# Patient Record
Sex: FEMALE | Race: OTHER
[De-identification: names, ages, dates, MRNs, and addresses within clinical notes are randomized per-mention and may not be internally consistent; named-entity substitution may affect disease eponyms.]

---

## 2018-12-13 NOTE — NUR
PT WAS BIB FAMILY C/O CHEST PAIN SINCE YDAY, PT AMBULATORY, AAOX4, RESPIRATIONS 
EVEN AND UNLABORED, NAD NOTED, PT ON MONITOR, VSS, AWAITING MD VICENTE

## 2018-12-13 NOTE — NUR
PT RESTING IN BED DENYING ANY CHEST PAIN OR DISTRESS.FAMILY MEMBERS AT BEDSIDE.CALL LIGHT 
PLACED WITHIN REACH.

## 2018-12-13 NOTE — NUR
RN NOTES



RECEIVED PATIENT AWAKE, HOB ELEVATED, ON ROOM AIR AND TOLERATED WELL. TELE MONITOR READS 
SINUS RHYTHM WITH HEART RATE AT 81. PATIENT IS ALERT AND ORIENTED X3, DENIES PAIN, NAUSEA 
AND VOMITING AT THIS TIME. IV ACCESS ON RIGHT HAND INTACT. PLAN OF CARE DISCUSSED WITH THE 
PATIENT AND FAMILY AT BEDSIDE AND VERBALIZED UNDERSTANDING. FALL PRECAUTION OBSERVED. KEPT 
COMFORTABLE AND ATTENDED. WILL CONTINUE TO MONITOR PATIENT.

## 2018-12-13 NOTE — NUR
TELE RN ADMITTING NOTES

ADMITTED PT FROM ER VIA [place].PT ALERT AND ORIENTED X4.VERBALLY RESPONSIVE.SKIN INTACT.PT 
AMBULATES AD JULI.ON TELE WITH SR HR 82.DENIES CHEST PAIN OR ANY DISTRESS AT THIS TIME.O2 SAT 
96%RA .Japanese SPEAKING ONLY.DAUGHTER AT BEDSIDE.WITH ONGOING ZITHROMAX IV TO 
COMPLETE-INFUSING WELL.ORIENTED TO HER ROOM.CALL LIGHT PLACED WITHIN REACH.

## 2018-12-14 NOTE — NUR
blood sugar 57mg/dl. hbg 7.8/hct 23. Dr Fried group paged to inform about the patient,s 
blood sugar llevel and to clarify if lovenox can be given with the hbg level from 8.1. 
awaiting reponse from the physician on call, orange juice with 4 packets of recular sugar 
given as patient is alert, will bne given diet.

## 2018-12-14 NOTE — NUR
MS/RN   NOTE



RECEIVED CARDIAC DIET ORDER STARTING 12/14/18 BREAKFAST FROM DR MARI. THE ORDER IS READ 
BACK, VERIFIED. NOTED AND CARRIED OUT.

## 2018-12-14 NOTE — NUR
RN NOTES



PATIENT STABLE OVERNIGHT, VITAL SIGNS STABLE, AFEBRILE. NO SIGNS OF DISTRESS AND DISCOMFORT 
NOTED. DENIES PAIN, NAUSEA AND VOMITING. ON ROOM AIR AND TOLERATED WELL, DENIES SOB. 
TELEMONITOR READS SINUS RHYTHM WITH HEART RATE AT 81. KEPT PATIENT ON NPO PRIOR TO BE SEEN 
BY CARDIOLOGIST. FALL PRECAUTION OBSERVED. ALL NEEDS ATTENDED. WILL CONTINUE TO MONITOR PT.

## 2018-12-14 NOTE — NUR
MS/RN   NOTE



BLOOD SUGAR RECHECKED AND IT IS 78. THE PATIENT IN NO APPARENT DISTRESS. ALERT AND ORIENTED 
X3. IN NO DISTRESS.

## 2018-12-14 NOTE — NUR
with orders to hold the lovenox and to recheck the blood sugar after the orange juice with 4 
packets of regular sugar given. patient is alert and responsive

## 2018-12-14 NOTE — NUR
with orders to hold the lovenox tonight and will needs to reclarify the order for loveox due 
tro low hbg

## 2018-12-14 NOTE — NUR
MS/RN   NOTE





BLOOD SUGAR 157 AND 2 UNITS REGULAR INSULIN GIVEN PER ORDER. NO ADVERSE REACTIONS NOTED.

## 2018-12-14 NOTE — NUR
MS/RN   NOTE



THE PATIENT VERBALIZED RELIEF FROM SOB DUE TO HAVING OXYGEN (2L/MIN VIA NASAL CANNULA). WILL 
CONTINUE TO MONITOR THE PATIENT.

## 2018-12-14 NOTE — NUR
MS/RN   OPENING NOTE



THE PATIENT IS RECEIVED IN BED. ALERT AND ORIENTED X3 AND SPEAKS Danish. ABLE TO MAKE NEEDS 
KNOWN. DENIES PAIN. RESPIRATION REGULAR AND UNLABORED BUT WITH EPISODES OF SOB AND SHE HAS 
DIFFICULTY TAKING DEEP BREATH. OXYGEN SATURATION IN ROOM AIR AT 91%.  PATIENT IS PLACED ON 
2L/MIN VIA NASAL CANNULA AND SATURATION LEVEL INCREASED TO 95%. WILL CONTINUE TO MONITOR. 
RIGHT HAND G 20 PATENT AND SALINE LOCKED. BED LOW AND LOCKED. SIDE RAILS UP X3. CALL LIGHT 
WITHIN REACH. WILL CONTINUE TO MONITOR.

## 2018-12-14 NOTE — NUR
Met with patient and spouse, both speaks Turkmen only. Patient lives on the 3rd floor 
apartment with elevator access. She is ambulatory and independent with adl's. Her pcp is 
from the [facility] in [facility]. Family will ride when discharge. 


-------------------------------------------------------------------------------

Addendum: 12/14/18 at 1844 by JULI JOY RN

-------------------------------------------------------------------------------

Amended: Links added.

## 2018-12-14 NOTE — NUR
MS/RN   CLOSING NOTE



THE PATIENT ALERT AND ORIENTED X3. RECEIVING OXYGEN AT 2L/MIN VIA NASAL CANNULA AND 
SATURATION IS AT 95%. DENIES SOB AT THIS TIME. DENIES PAIN. RIGHT HAND G 20 PATENT AND 
SALINE LOCKED. BED LOW AND LOCKED. SIDE RAILS UP X3. CALL LIGHT WITHIN REACH. WILL ENDORSE 
TO NIGHT SHIFT.

## 2018-12-15 NOTE — NUR
DISCHARGE NOTE



PT DISCHARGED IN MEDICALLY STABLE CONDITION AT THIS TIME HOME WITH DAUGHTER ABDIEL IN THEIR 
PRIVATE CAR, ALL DISCHARGE PAPERWORK WAS DISCUSSED WITH THE PATIENT AND DAUGHTER, DISCHARGE 
PAPERWORK AND BELONGING LIST WERE SIGNED, IV AND ID BAND WERE REMOVED, PT SKIN WAS DRY AND 
INTACT WITH NO WOUNDS NOTED OR DOCUMENTED IN THE CHART, PT WAS WALKED DOWN BY ME TO THE 
ENTRANCE WHERE SHE LEFT WITH HER DAUGHTER, SHE LEFT A/O X3, NO PAIN OR S/S OF DISTRESS 
NOTED, BREATHING EVEN AND UNLABORED, SKIN DRY AND INTACT WITH NO WOUNDS NOTED, AMBULATORY. 
ALL NEEDS WERE ATTENDED TO DURING HER STAY.

## 2018-12-15 NOTE — NUR
RN OPENING NOTES



PT RECIEVED IN BED AT LOWEST AND LOCKED POSITION WITH SIDE RAILS UP X2, A/O X3 Bengali 
SPEAKING, BREATHING EVEN AND UNLABORED ON RA, NO S/S OF PAIN OR DISTRESS NOTED AT THIS TIME, 
NOTED TO BE NPO BUT ACCORDING TO NIGHT SHIFT RN SHE IS ON A CARDIAC DIET, IV IS PATENT AND 
INTACT, SAFETY PRECAUTIONS IN PLACE, CALL LIGHT WITHIN REACH, WILL MONITOR ACCORDINGLY

## 2022-07-10 ENCOUNTER — HOSPITAL ENCOUNTER (EMERGENCY)
Dept: HOSPITAL 54 - ER | Age: 72
Discharge: HOME | End: 2022-07-10
Payer: COMMERCIAL

## 2022-07-10 VITALS — HEIGHT: 64 IN | BODY MASS INDEX: 25.61 KG/M2 | WEIGHT: 150 LBS

## 2022-07-10 VITALS — DIASTOLIC BLOOD PRESSURE: 74 MMHG | SYSTOLIC BLOOD PRESSURE: 131 MMHG

## 2022-07-10 DIAGNOSIS — Z79.899: ICD-10-CM

## 2022-07-10 DIAGNOSIS — N18.9: ICD-10-CM

## 2022-07-10 DIAGNOSIS — E87.5: ICD-10-CM

## 2022-07-10 DIAGNOSIS — I10: ICD-10-CM

## 2022-07-10 DIAGNOSIS — R42: Primary | ICD-10-CM

## 2022-07-10 DIAGNOSIS — E11.65: ICD-10-CM

## 2022-07-10 DIAGNOSIS — E11.22: ICD-10-CM

## 2022-07-10 LAB
ALBUMIN SERPL BCP-MCNC: 3.5 G/DL (ref 3.4–5)
ALP SERPL-CCNC: 109 U/L (ref 46–116)
ALT SERPL W P-5'-P-CCNC: 14 U/L (ref 12–78)
AST SERPL W P-5'-P-CCNC: 16 U/L (ref 15–37)
BASOPHILS # BLD AUTO: 0 K/UL (ref 0–0.2)
BASOPHILS NFR BLD AUTO: 0.6 % (ref 0–2)
BILIRUB DIRECT SERPL-MCNC: 0.1 MG/DL (ref 0–0.2)
BILIRUB SERPL-MCNC: 0.2 MG/DL (ref 0.2–1)
BUN SERPL-MCNC: 41 MG/DL (ref 7–18)
CALCIUM SERPL-MCNC: 8.7 MG/DL (ref 8.5–10.1)
CHLORIDE SERPL-SCNC: 100 MMOL/L (ref 98–107)
CO2 SERPL-SCNC: 25 MMOL/L (ref 21–32)
CREAT SERPL-MCNC: 2.8 MG/DL (ref 0.6–1.3)
EOSINOPHIL NFR BLD AUTO: 2.2 % (ref 0–6)
EOSINOPHIL NFR BLD MANUAL: 2 % (ref 0–4)
GLUCOSE SERPL-MCNC: 280 MG/DL (ref 74–106)
HCT VFR BLD AUTO: 30 % (ref 33–45)
HGB BLD-MCNC: 10.1 G/DL (ref 11.5–14.8)
LYMPHOCYTES NFR BLD AUTO: 2 K/UL (ref 0.8–4.8)
LYMPHOCYTES NFR BLD AUTO: 25.1 % (ref 20–44)
LYMPHOCYTES NFR BLD MANUAL: 20 % (ref 16–48)
MCHC RBC AUTO-ENTMCNC: 33 G/DL (ref 31–36)
MCV RBC AUTO: 80 FL (ref 82–100)
MONOCYTES NFR BLD AUTO: 0.5 K/UL (ref 0.1–1.3)
MONOCYTES NFR BLD AUTO: 6.4 % (ref 2–12)
MONOCYTES NFR BLD MANUAL: 6 % (ref 0–11)
NEUTROPHILS # BLD AUTO: 5.3 K/UL (ref 1.8–8.9)
NEUTROPHILS NFR BLD AUTO: 65.7 % (ref 43–81)
NEUTS BAND NFR BLD MANUAL: 1 % (ref 0–5)
NEUTS SEG NFR BLD MANUAL: 71 % (ref 42–76)
PLATELET # BLD AUTO: 87 K/UL (ref 150–450)
POTASSIUM SERPL-SCNC: 5.6 MMOL/L (ref 3.5–5.1)
PROT SERPL-MCNC: 8.8 G/DL (ref 6.4–8.2)
RBC # BLD AUTO: 3.79 MIL/UL (ref 4–5.2)
SODIUM SERPL-SCNC: 132 MMOL/L (ref 136–145)
WBC NRBC COR # BLD AUTO: 8 K/UL (ref 4.3–11)

## 2022-07-10 PROCEDURE — 80076 HEPATIC FUNCTION PANEL: CPT

## 2022-07-10 PROCEDURE — 99285 EMERGENCY DEPT VISIT HI MDM: CPT

## 2022-07-10 PROCEDURE — 96361 HYDRATE IV INFUSION ADD-ON: CPT

## 2022-07-10 PROCEDURE — 85007 BL SMEAR W/DIFF WBC COUNT: CPT

## 2022-07-10 PROCEDURE — 36415 COLL VENOUS BLD VENIPUNCTURE: CPT

## 2022-07-10 PROCEDURE — 80048 BASIC METABOLIC PNL TOTAL CA: CPT

## 2022-07-10 PROCEDURE — 85025 COMPLETE CBC W/AUTO DIFF WBC: CPT

## 2022-07-10 PROCEDURE — 96360 HYDRATION IV INFUSION INIT: CPT

## 2022-07-10 PROCEDURE — 70450 CT HEAD/BRAIN W/O DYE: CPT

## 2022-07-10 PROCEDURE — 93005 ELECTROCARDIOGRAM TRACING: CPT
